# Patient Record
Sex: MALE | Race: WHITE
[De-identification: names, ages, dates, MRNs, and addresses within clinical notes are randomized per-mention and may not be internally consistent; named-entity substitution may affect disease eponyms.]

---

## 2019-12-11 ENCOUNTER — HOSPITAL ENCOUNTER (OUTPATIENT)
Dept: HOSPITAL 61 - ENDOS | Age: 64
End: 2019-12-11
Attending: INTERNAL MEDICINE
Payer: MEDICARE

## 2019-12-11 VITALS — DIASTOLIC BLOOD PRESSURE: 68 MMHG | SYSTOLIC BLOOD PRESSURE: 111 MMHG

## 2019-12-11 DIAGNOSIS — F44.9: ICD-10-CM

## 2019-12-11 DIAGNOSIS — F41.9: ICD-10-CM

## 2019-12-11 DIAGNOSIS — R19.5: Primary | ICD-10-CM

## 2019-12-11 DIAGNOSIS — K57.30: ICD-10-CM

## 2019-12-11 DIAGNOSIS — K64.0: ICD-10-CM

## 2019-12-11 DIAGNOSIS — I10: ICD-10-CM

## 2019-12-11 PROCEDURE — 45378 DIAGNOSTIC COLONOSCOPY: CPT

## 2019-12-12 NOTE — CONS
DATE OF CONSULTATION:  12/11/2019



GASTROINTESTINAL CONSULTATION



REFERRING PHYSICIAN:  Dr. Donnell Feng.



REASON:  Positive Cologuard.



HISTORY OF PRESENT ILLNESS:  A 64-year-old  male with past medical

history significant for hypertension, COPD and osteoarthrosis, seen for positive

Cologuard.  Bowel habits have been regular without diarrhea or constipation. 

There has been no melena and/or hematochezia.  Weight and appetite are stable. 

No family history of colon cancer, polyps, is elicited.  He has not undergone

previous screening studies.  He has no additional complaints.



PAST MEDICAL HISTORY:  COPD, hypertension, anxiety.



ALLERGIES:  None.



MEDICATIONS:  Include albuterol, alprazolam, aspirin, Fasenra, lisinopril,

hydrochlorothiazide, montelukast and Spiriva inhaler.



FAMILY HISTORY:  Noncontributory.



PAST SURGICAL HISTORY:  Noncontributory.



REVIEW OF SYSTEMS:  Per records.



PHYSICAL EXAMINATION:

GENERAL:  Reveals a well-nourished, well-developed  male.

VITAL SIGNS:  Temperature is 97.9, pulse 100, respirations 22, pulse oximetry

93% on room air.

HEENT:  There is no decrease in the visual acuity issues.

LUNGS:  Reveal decreased breath sounds.

CARDIOVASCULAR:  S1, S2 without S3, S4 or appreciable murmur.

ABDOMEN:  Reveals a soft abdomen.  Normoactive bowel sounds without appreciable

hepatosplenomegaly.

EXTREMITIES:  No cyanosis, clubbing, edema.



IMPRESSION:  Positive Cologuard.  Colonoscopy is recommended to assess for

polyps and/or malignancy.  Risks and benefits have been discussed with the

patient including risk of hemorrhage and perforation and is willing to proceed.

 



______________________________

IGNACIO TRISTAN MD



DR:  SSP/federico  JOB#:  [id number] / [id number]

DD:  12/11/2019 07:40  DT:  12/11/2019 07:59



DONNELL Byrnes MD